# Patient Record
Sex: MALE | Race: WHITE | URBAN - METROPOLITAN AREA
[De-identification: names, ages, dates, MRNs, and addresses within clinical notes are randomized per-mention and may not be internally consistent; named-entity substitution may affect disease eponyms.]

---

## 2021-04-04 NOTE — PROGRESS NOTES
Assessment and Plan   Diagnoses and all orders for this visit:    1. Mild intermittent asthma without complication  -     albuterol (PROVENTIL HFA, VENTOLIN HFA, PROAIR HFA) 90 mcg/actuation inhaler; Take 1 Puff by inhalation every four (4) hours as needed for Wheezing. Worse with allergies. Reports wheezing and coughing when he has been running. Recommend starting albuterol prior to exercise. If persistent, consider steroid inhaler. Reports he has been on Qvar in the past    2. Insomnia, unspecified type  -     suvorexant (BELSOMRA) 10 mg tablet; Take 1 Tab by mouth nightly as needed for Insomnia. Max Daily Amount: 10 mg.  Previous medications: Trazodone(oversedation, made symptoms worse), Ambien (sleepwalking); wants to avoid antidepressants as those make his symptoms worse    Lifelong issue since he was a child. Wakes up about 3-4 times a night. Able to fall asleep with medications but not sleep through the night. Currently on melatonin 10 mg and Unisom 25 mg. Not well controlled. Discussed trial of Belsomra. If not covered or not effective, we also discussed dayvigo. Did not want to try doxepin as he feels antidepressants make his symptoms worse. 3. Gastroesophageal reflux disease without esophagitis  Triggered by food. Uses Nexium as needed    4. History of inguinal hernia  Has had 2 hernia repairs in the past with a mesh on his right side. Was told by his surgeon that he will likely need repairs in the future. Patient would like to defer Be well testing at this time    History of depression  Previously on Zoloft but has not been an issue for the past 11 years    Benefits, risks, possible drug interactions, and side effects of all new medications were reviewed with the patient. Pt verbalized understanding. Return to clinic: 1 year for physical if sleep medications work, earlier if needed    An electronic signature was used to authenticate this note.   Barron Warner MD  [facility]  4/5/2021    No future appointments. History of Present Illness   Chief Complaint   Establish care    Amanda Hernandez is a 30 y.o. male         Review of Systems   Constitutional: Negative for chills and fever. HENT: Negative for hearing loss. Eyes: Negative for blurred vision. Respiratory: Negative for shortness of breath. Cardiovascular: Negative for chest pain. Gastrointestinal: Negative for abdominal pain, blood in stool, constipation, diarrhea, melena, nausea and vomiting. Genitourinary: Negative for dysuria and hematuria. Musculoskeletal: Negative for joint pain. Skin: Negative for rash. Neurological: Negative for headaches. Past Medical History     Allergies   Allergen Reactions    Penicillins Rash     Can do keflex        Current Outpatient Medications   Medication Sig    melatonin 5 mg tablet Take 10 mg by mouth nightly.  doxylamine succinate (UNISOM) 25 mg tablet Take 25 mg by mouth nightly as needed.  esomeprazole (NEXIUM) 40 mg capsule Take 40 mg by mouth. Daily prn    albuterol (PROVENTIL HFA, VENTOLIN HFA, PROAIR HFA) 90 mcg/actuation inhaler Take 1 Puff by inhalation every four (4) hours as needed for Wheezing.  suvorexant (BELSOMRA) 10 mg tablet Take 1 Tab by mouth nightly as needed for Insomnia. Max Daily Amount: 10 mg. No current facility-administered medications for this visit.            Patient Active Problem List   Diagnosis Code    History of inguinal hernia Z87.19    Gastroesophageal reflux disease without esophagitis K21.9    Insomnia, unspecified type G47.00    Mild intermittent asthma without complication I27.23     Past Surgical History:   Procedure Laterality Date    HX APPENDECTOMY      HX HERNIA REPAIR      repair at 7yo L, 24yo R      Social History     Tobacco Use    Smoking status: Never Smoker    Smokeless tobacco: Never Used   Substance Use Topics    Alcohol use: Yes     Comment: 6 drinks/week      Family History   Problem Relation Age of Onset    Other Mother         multiple slcerosis    Cancer Father         tongue (from HPV)    Heart Failure Maternal Grandmother     COPD Maternal Grandmother     Asthma Maternal Grandmother     Hypertension Maternal Grandfather     Heart Disease Maternal Grandfather         afib    High Cholesterol Maternal Grandfather     Alzheimer Paternal Grandfather     Asthma Maternal Great Grandmother     Stroke Neg Hx     Heart Attack Neg Hx         Physical Exam   Vitals:       Visit Vitals  BP (!) 138/90   Pulse 93   Temp 98.2 °F (36.8 °C)   Ht 5' 11\" (1.803 m)   Wt 165 lb 12.8 oz (75.2 kg)   SpO2 96%   BMI 23.12 kg/m²        Physical Exam  Constitutional:       General: He is not in acute distress. Appearance: He is well-developed. HENT:      Right Ear: Tympanic membrane and external ear normal. There is impacted cerumen. Left Ear: Tympanic membrane and external ear normal. There is impacted cerumen. Eyes:      Extraocular Movements: Extraocular movements intact. Conjunctiva/sclera: Conjunctivae normal.   Neck:      Musculoskeletal: Neck supple. Cardiovascular:      Rate and Rhythm: Normal rate and regular rhythm. Pulses: Normal pulses. Heart sounds: No murmur. No friction rub. No gallop. Pulmonary:      Effort: No respiratory distress. Breath sounds: No wheezing, rhonchi or rales. Abdominal:      General: Bowel sounds are normal. There is no distension. Palpations: Abdomen is soft. There is no hepatomegaly, splenomegaly or mass. Tenderness: There is no abdominal tenderness. There is no guarding. Skin:     General: Skin is warm. Findings: No rash. Neurological:      Mental Status: He is alert.

## 2021-04-05 ENCOUNTER — OFFICE VISIT (OUTPATIENT)
Dept: INTERNAL MEDICINE CLINIC | Age: 26
End: 2021-04-05
Payer: COMMERCIAL

## 2021-04-05 VITALS
DIASTOLIC BLOOD PRESSURE: 90 MMHG | OXYGEN SATURATION: 96 % | BODY MASS INDEX: 23.21 KG/M2 | WEIGHT: 165.8 LBS | HEIGHT: 71 IN | SYSTOLIC BLOOD PRESSURE: 138 MMHG | HEART RATE: 93 BPM | TEMPERATURE: 98.2 F

## 2021-04-05 DIAGNOSIS — Z86.59 HISTORY OF DEPRESSION: ICD-10-CM

## 2021-04-05 DIAGNOSIS — K21.9 GASTROESOPHAGEAL REFLUX DISEASE WITHOUT ESOPHAGITIS: ICD-10-CM

## 2021-04-05 DIAGNOSIS — Z87.19 HISTORY OF INGUINAL HERNIA: ICD-10-CM

## 2021-04-05 DIAGNOSIS — G47.00 INSOMNIA, UNSPECIFIED TYPE: ICD-10-CM

## 2021-04-05 DIAGNOSIS — J45.20 MILD INTERMITTENT ASTHMA WITHOUT COMPLICATION: Primary | ICD-10-CM

## 2021-04-05 PROCEDURE — 99204 OFFICE O/P NEW MOD 45 MIN: CPT | Performed by: INTERNAL MEDICINE

## 2021-04-05 RX ORDER — CHOLECALCIFEROL (VITAMIN D3) 125 MCG
10 CAPSULE ORAL
COMMUNITY

## 2021-04-05 RX ORDER — ESOMEPRAZOLE MAGNESIUM 40 MG/1
20 CAPSULE, DELAYED RELEASE ORAL
COMMUNITY
End: 2022-09-26

## 2021-04-05 RX ORDER — ALBUTEROL SULFATE 90 UG/1
1 AEROSOL, METERED RESPIRATORY (INHALATION)
Qty: 1 INHALER | Refills: 3 | Status: SHIPPED | OUTPATIENT
Start: 2021-04-05

## 2021-04-08 ENCOUNTER — TELEPHONE (OUTPATIENT)
Dept: INTERNAL MEDICINE CLINIC | Age: 26
End: 2021-04-08

## 2021-04-08 ENCOUNTER — DOCUMENTATION ONLY (OUTPATIENT)
Dept: INTERNAL MEDICINE CLINIC | Age: 26
End: 2021-04-08

## 2021-04-08 NOTE — PROGRESS NOTES
PA for Belsomra sent to insurance via cover my meds. Key: [id number].  Waiting for response from insurance company

## 2021-04-08 NOTE — TELEPHONE ENCOUNTER
----- Message from [facility] sent at 4/8/2021 12:10 PM EDT -----  Regarding: Dr. Lanre Farnsworth Message/Vendor Calls    Caller's first and last name: Pt      Reason for call: Checking status of Prior Auth sent Monday      Callback required yes/no and why: Yes,       Best contact number(s): [phone]      Details to clarify the request: N/A      [facility]

## 2021-04-20 ENCOUNTER — DOCUMENTATION ONLY (OUTPATIENT)
Dept: INTERNAL MEDICINE CLINIC | Age: 26
End: 2021-04-20

## 2021-04-20 NOTE — PROGRESS NOTES
PA approved for Belsomra 10mg effective for maximum of 12 refills from 4/19/21-4/18/22. Pharmacy and patient made aware. PA for Belsomra sent to insurance via cover my meds. Key: [id number].  Waiting for response from insurance company

## 2021-06-18 DIAGNOSIS — G47.00 INSOMNIA, UNSPECIFIED TYPE: ICD-10-CM

## 2021-09-20 DIAGNOSIS — G47.00 INSOMNIA, UNSPECIFIED TYPE: ICD-10-CM

## 2022-01-09 DIAGNOSIS — G47.00 INSOMNIA, UNSPECIFIED TYPE: ICD-10-CM

## 2022-03-19 PROBLEM — Z87.19 HISTORY OF INGUINAL HERNIA: Status: ACTIVE | Noted: 2021-04-05

## 2022-03-19 PROBLEM — K21.9 GASTROESOPHAGEAL REFLUX DISEASE WITHOUT ESOPHAGITIS: Status: ACTIVE | Noted: 2021-04-05

## 2022-03-19 PROBLEM — J45.20 MILD INTERMITTENT ASTHMA WITHOUT COMPLICATION: Status: ACTIVE | Noted: 2021-04-05

## 2022-03-20 PROBLEM — G47.00 INSOMNIA, UNSPECIFIED TYPE: Status: ACTIVE | Noted: 2021-04-05

## 2022-04-09 DIAGNOSIS — G47.00 INSOMNIA, UNSPECIFIED TYPE: ICD-10-CM

## 2022-04-13 ENCOUNTER — VIRTUAL VISIT (OUTPATIENT)
Dept: INTERNAL MEDICINE CLINIC | Age: 27
End: 2022-04-13
Payer: COMMERCIAL

## 2022-04-13 ENCOUNTER — TELEPHONE (OUTPATIENT)
Dept: INTERNAL MEDICINE CLINIC | Age: 27
End: 2022-04-13

## 2022-04-13 DIAGNOSIS — J01.00 ACUTE NON-RECURRENT MAXILLARY SINUSITIS: Primary | ICD-10-CM

## 2022-04-13 PROCEDURE — 99213 OFFICE O/P EST LOW 20 MIN: CPT | Performed by: INTERNAL MEDICINE

## 2022-04-13 RX ORDER — CEFDINIR 300 MG/1
300 CAPSULE ORAL 2 TIMES DAILY
Qty: 20 CAPSULE | Refills: 0 | Status: SHIPPED | OUTPATIENT
Start: 2022-04-13 | End: 2022-05-03

## 2022-04-13 RX ORDER — CEFUROXIME AXETIL 500 MG/1
500 TABLET ORAL 2 TIMES DAILY
Qty: 20 TABLET | Refills: 0 | Status: SHIPPED | OUTPATIENT
Start: 2022-04-13 | End: 2022-04-13

## 2022-04-13 NOTE — TELEPHONE ENCOUNTER
Nurse called patient pharmacy to confirm that only  omnicef abx is needed per pcp. Pharmacist thankful for confirmation.

## 2022-04-13 NOTE — LETTER
NOTIFICATION RETURN TO WORK / SCHOOL    4/13/2022 12:42 PM    Mr. Eben Johns  [address] [facility] [address]      To Whom It May Concern:    Eben Johns is currently under the care of [address] [place] [address],[address]. Please excuse patient from work April 14, 2022 due to medical illness. If there are questions or concerns please have the patient contact our office.         Sincerely,      Jaswinder Kwon MD

## 2022-04-13 NOTE — TELEPHONE ENCOUNTER
[place] outpatient pharmacy is calling to ask if Dr. June Mendez really wants to prescribe both cefdinir and ceftriaxone as there is a lot of overlap and not much benefit to adding both together. Please call pharmacist back and advise.

## 2022-05-03 ENCOUNTER — OFFICE VISIT (OUTPATIENT)
Dept: INTERNAL MEDICINE CLINIC | Age: 27
End: 2022-05-03
Payer: COMMERCIAL

## 2022-05-03 VITALS
RESPIRATION RATE: 14 BRPM | HEART RATE: 72 BPM | BODY MASS INDEX: 23.1 KG/M2 | TEMPERATURE: 98 F | HEIGHT: 71 IN | WEIGHT: 165 LBS | SYSTOLIC BLOOD PRESSURE: 136 MMHG | DIASTOLIC BLOOD PRESSURE: 76 MMHG | OXYGEN SATURATION: 98 %

## 2022-05-03 DIAGNOSIS — J45.20 MILD INTERMITTENT ASTHMA WITHOUT COMPLICATION: ICD-10-CM

## 2022-05-03 DIAGNOSIS — Z00.00 WELL ADULT EXAM: Primary | ICD-10-CM

## 2022-05-03 DIAGNOSIS — G47.00 INSOMNIA, UNSPECIFIED TYPE: ICD-10-CM

## 2022-05-03 DIAGNOSIS — K21.9 GASTROESOPHAGEAL REFLUX DISEASE WITHOUT ESOPHAGITIS: ICD-10-CM

## 2022-05-03 PROBLEM — Z86.59 HISTORY OF DEPRESSION: Status: ACTIVE | Noted: 2022-05-03

## 2022-05-03 PROCEDURE — 99395 PREV VISIT EST AGE 18-39: CPT | Performed by: INTERNAL MEDICINE

## 2022-05-03 NOTE — PROGRESS NOTES
Assessment and Plan     1. Well adult exam  Assessment & Plan:  Advised to get PCV 20 vaccine due to history of pneumonia  Runs 5 miles about twice a week  Plans to get his labs with be well  2. Insomnia, unspecified type  Assessment & Plan:  4/5/21 - Lifelong issue since he was a child. Wakes up about 3-4 times a night. Able to fall asleep with medications but not sleep through the night. Currently on melatonin 10 mg and Unisom 25 mg.     Not well controlled. Discussed trial of Belsomra. If not covered or not effective, we also discussed dayvigo. Did not want to try doxepin as he feels antidepressants make his symptoms worse.  ===========  Borderline controlled with Belsomra 10 mg daily and melatonin 10 mg daily. We will also use Unisom as needed. We discussed potentially increasing to Belsomra 20 but he would like to monitor for now. Advised to let us know if he wants an increased dose. Previous medications: Trazodone(oversedation, made symptoms worse), Ambien (sleepwalking); wants to avoid antidepressants as those make his symptoms worse  Orders:  -     suvorexant (BELSOMRA) 10 mg tablet; Take 1 Tablet by mouth nightly as needed for Insomnia. Max Daily Amount: 10 mg., Normal, Disp-90 Tablet, R-3  3. Gastroesophageal reflux disease without esophagitis  Assessment & Plan:   well controlled, continue current medications   Triggered by food. Uses Nexium as needed  4. Mild intermittent asthma without complication  Assessment & Plan:   well controlled, continue current medications   Uses albuterol about every other week and sometimes with exercise  Previous medications: Qvar       Benefits, risks, possible drug interactions, and side effects of all new medications were reviewed with the patient. Pt verbalized understanding. Return to clinic: 1 year for physical or earlier if needed  [facility] pharmacist (usually ER, ICU)    An electronic signature was used to authenticate this note.   Ashlee Bravo, MD  [facility]  5/3/2022    No future appointments. History of Present Illness   Chief Complaint   Physical    Cristy Minor is a 27 y.o. male         Review of Systems   Constitutional: Negative for chills and fever. HENT: Negative for hearing loss. Eyes: Negative for blurred vision. Respiratory: Negative for shortness of breath. Cardiovascular: Negative for chest pain. Gastrointestinal: Negative for abdominal pain, blood in stool, constipation, diarrhea, melena, nausea and vomiting. Genitourinary: Negative for dysuria and hematuria. Musculoskeletal: Negative for joint pain. Skin: Negative for rash. Neurological: Negative for headaches. Past Medical History     Allergies   Allergen Reactions    Penicillins Rash     Can do keflex        Current Outpatient Medications   Medication Sig    suvorexant (BELSOMRA) 10 mg tablet Take 1 Tablet by mouth nightly as needed for Insomnia. Max Daily Amount: 10 mg.    melatonin 5 mg tablet Take 10 mg by mouth nightly.  doxylamine succinate (UNISOM) 25 mg tablet Take 25 mg by mouth nightly as needed.  esomeprazole (NEXIUM) 40 mg capsule Take 20 mg by mouth. Daily prn    albuterol (PROVENTIL HFA, VENTOLIN HFA, PROAIR HFA) 90 mcg/actuation inhaler Take 1 Puff by inhalation every four (4) hours as needed for Wheezing. No current facility-administered medications for this visit.           Patient Active Problem List   Diagnosis Code    History of inguinal hernia Z87.19    Gastroesophageal reflux disease without esophagitis K21.9    Insomnia, unspecified type G47.00    Mild intermittent asthma without complication H56.59    History of depression Z86.59    Well adult exam Z00.00     Past Surgical History:   Procedure Laterality Date    HX APPENDECTOMY      HX HERNIA REPAIR      repair at 7yo L, 22yo R      Social History     Tobacco Use    Smoking status: Never Smoker    Smokeless tobacco: Never Used Substance Use Topics    Alcohol use: Yes     Comment: 6 drinks/week      Family History   Problem Relation Age of Onset    Other Mother         multiple slcerosis    Cancer Father         tongue (from HPV)    Heart Failure Maternal Grandmother     COPD Maternal Grandmother     Asthma Maternal Grandmother     Hypertension Maternal Grandfather     Heart Disease Maternal Grandfather         afib    High Cholesterol Maternal Grandfather     Alzheimer's Disease Paternal Grandfather     Asthma Maternal Great Grandmother     Stroke Neg Hx     Heart Attack Neg Hx         Physical Exam   Vitals:       Visit Vitals  /76 (BP 1 Location: Left upper arm, BP Patient Position: Sitting, BP Cuff Size: Adult)   Pulse 72   Temp 98 °F (36.7 °C) (Oral)   Resp 14   Ht 5' 11\" (1.803 m)   Wt 165 lb (74.8 kg)   SpO2 98%   BMI 23.01 kg/m²        Physical Exam  Constitutional:       General: He is not in acute distress. Appearance: He is well-developed. HENT:      Right Ear: Tympanic membrane, ear canal and external ear normal.      Left Ear: Tympanic membrane, ear canal and external ear normal.      Mouth/Throat:      Mouth: Mucous membranes are moist.      Pharynx: No posterior oropharyngeal erythema. Eyes:      Extraocular Movements: Extraocular movements intact. Conjunctiva/sclera: Conjunctivae normal.   Cardiovascular:      Rate and Rhythm: Normal rate and regular rhythm. Pulses: Normal pulses. Heart sounds: No murmur heard. No friction rub. No gallop. Pulmonary:      Effort: No respiratory distress. Breath sounds: No wheezing, rhonchi or rales. Abdominal:      General: Bowel sounds are normal. There is no distension. Palpations: Abdomen is soft. There is no hepatomegaly, splenomegaly or mass. Tenderness: There is no abdominal tenderness. There is no guarding. Musculoskeletal:      Cervical back: Neck supple. Right lower leg: No edema. Left lower leg: No edema. Lymphadenopathy:      Cervical: No cervical adenopathy. Skin:     General: Skin is warm. Findings: No rash. Neurological:      Mental Status: He is alert.

## 2022-05-03 NOTE — ASSESSMENT & PLAN NOTE
4/5/21 - Lifelong issue since he was a child. Wakes up about 3-4 times a night. Able to fall asleep with medications but not sleep through the night. Currently on melatonin 10 mg and Unisom 25 mg.     Not well controlled. Discussed trial of Belsomra. If not covered or not effective, we also discussed dayvigo. Did not want to try doxepin as he feels antidepressants make his symptoms worse.  ===========  Borderline controlled with Belsomra 10 mg daily and melatonin 10 mg daily. We will also use Unisom as needed. We discussed potentially increasing to Belsomra 20 but he would like to monitor for now. Advised to let us know if he wants an increased dose.   Previous medications: Trazodone(oversedation, made symptoms worse), Ambien (sleepwalking); wants to avoid antidepressants as those make his symptoms worse

## 2022-05-03 NOTE — ASSESSMENT & PLAN NOTE
well controlled, continue current medications   Uses albuterol about every other week and sometimes with exercise  Previous medications: Qvar

## 2022-05-03 NOTE — ASSESSMENT & PLAN NOTE
Advised to get PCV 20 vaccine due to history of pneumonia  Runs 5 miles about twice a week  Plans to get his labs with be well

## 2022-06-02 PROBLEM — Z00.00 WELL ADULT EXAM: Status: RESOLVED | Noted: 2022-05-03 | Resolved: 2022-06-02

## 2022-06-15 ENCOUNTER — PATIENT MESSAGE (OUTPATIENT)
Dept: INTERNAL MEDICINE CLINIC | Age: 27
End: 2022-06-15

## 2022-06-16 RX ORDER — DOXYCYCLINE 100 MG/1
TABLET ORAL
Qty: 2 TABLET | Refills: 0 | Status: SHIPPED | OUTPATIENT
Start: 2022-06-16 | End: 2022-06-16 | Stop reason: SDUPTHER

## 2022-06-16 RX ORDER — DOXYCYCLINE 100 MG/1
TABLET ORAL
Qty: 2 TABLET | Refills: 0 | Status: SHIPPED | OUTPATIENT
Start: 2022-06-16 | End: 2022-06-29 | Stop reason: ALTCHOICE

## 2022-06-16 NOTE — TELEPHONE ENCOUNTER
I spoke with patient,  He states he sent a [employer] message yesterday and never got a response. He is worried about the deer tick he removed from his leg. The tick was there for 24-36 hours. Pt sent a [employer] message but it went to the general patient advice pool not our office, I explained to pt why we didn't receive the  Message directly yesterday. 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 I advised I will send this message to a covering provider.

## 2022-06-29 ENCOUNTER — OFFICE VISIT (OUTPATIENT)
Dept: INTERNAL MEDICINE CLINIC | Age: 27
End: 2022-06-29
Payer: COMMERCIAL

## 2022-06-29 VITALS
OXYGEN SATURATION: 97 % | WEIGHT: 168.4 LBS | TEMPERATURE: 97.8 F | SYSTOLIC BLOOD PRESSURE: 152 MMHG | HEIGHT: 71 IN | BODY MASS INDEX: 23.57 KG/M2 | HEART RATE: 71 BPM | RESPIRATION RATE: 14 BRPM | DIASTOLIC BLOOD PRESSURE: 83 MMHG

## 2022-06-29 DIAGNOSIS — K62.5 BRBPR (BRIGHT RED BLOOD PER RECTUM): Primary | ICD-10-CM

## 2022-06-29 PROCEDURE — 99212 OFFICE O/P EST SF 10 MIN: CPT | Performed by: INTERNAL MEDICINE

## 2022-06-29 NOTE — ASSESSMENT & PLAN NOTE
BRBPR  Ongoing episodes maybe once a month for at least the last 5 years   In toilet bowl, in stools, on toilet paper  Never had pain before but now hurts   occl diarrhea and constipation  Pain with sitting, itching  No lightheadedness, dizziness   No abd pain, v  Mild nausea  External hemorrhoid noted 12pm position, anal tear 4pm, possible small hemorrhoid 6pm position. Possible internal hemorrhoid 12pm position. Possibly related to hemorrhoids. However given age, diarrhea, and frequency of bleeding, recommend GI referral to eval for possible IBD.   Preparation H OTC for symptoms

## 2022-06-29 NOTE — PROGRESS NOTES
Note   Chief Complaint   BRBPR    Sylvain Haines is a 27 y.o. male     1. BRBPR (bright red blood per rectum)  Assessment & Plan:   BRBPR  Ongoing episodes maybe once a month for at least the last 5 years   In toilet bowl, in stools, on toilet paper  Never had pain before but now hurts   occl diarrhea and constipation  Pain with sitting, itching  No lightheadedness, dizziness   No abd pain, v  Mild nausea  External hemorrhoid noted 12pm position, anal tear 4pm, possible small hemorrhoid 6pm position. Possible internal hemorrhoid 12pm position. Possibly related to hemorrhoids. However given age, diarrhea, and frequency of bleeding, recommend GI referral to eval for possible IBD. Preparation H OTC for symptoms  Orders:  -     REFERRAL TO GASTROENTEROLOGY       Benefits, risks, possible drug interactions, and side effects of all new medications were reviewed with the patient. Pt verbalized understanding. Return to clinic:  1 year for physical or earlier if needed pending GI workup  [facility] pharmacist (usually ER, ICU)    An electronic signature was used to authenticate this note. Maeve Marino MD  [facility]  6/29/2022    No future appointments. Objective   Vitals:       Visit Vitals  BP (!) 152/83 (BP 1 Location: Left upper arm, BP Patient Position: Sitting, BP Cuff Size: Adult)   Pulse 71   Temp 97.8 °F (36.6 °C) (Oral)   Resp 14   Ht 5' 11\" (1.803 m)   Wt 168 lb 6.4 oz (76.4 kg)   SpO2 97%   BMI 23.49 kg/m²        Physical Exam  Constitutional:       Appearance: Normal appearance. He is not ill-appearing. Pulmonary:      Effort: No respiratory distress. Genitourinary:     Comments: External hemorrhoid noted 12pm position, anal tear 4pm, possible small hemorrhoid 6pm position. Possible internal hemorrhoid 12pm position. Neurological:      Mental Status: He is alert.           Current Outpatient Medications   Medication Sig    suvorexant (BELSOMRA) 10 mg tablet Take 1 Tablet by mouth nightly as needed for Insomnia. Max Daily Amount: 10 mg.    melatonin 5 mg tablet Take 10 mg by mouth nightly.  doxylamine succinate (UNISOM) 25 mg tablet Take 25 mg by mouth nightly as needed.  esomeprazole (NEXIUM) 40 mg capsule Take 20 mg by mouth. Daily prn    albuterol (PROVENTIL HFA, VENTOLIN HFA, PROAIR HFA) 90 mcg/actuation inhaler Take 1 Puff by inhalation every four (4) hours as needed for Wheezing. No current facility-administered medications for this visit.

## 2022-09-26 RX ORDER — PANTOPRAZOLE SODIUM 20 MG/1
20 TABLET, DELAYED RELEASE ORAL DAILY
Qty: 90 TABLET | Refills: 3 | Status: SHIPPED | OUTPATIENT
Start: 2022-09-26

## 2022-11-18 DIAGNOSIS — G47.00 INSOMNIA, UNSPECIFIED TYPE: ICD-10-CM

## 2022-11-19 RX ORDER — SUVOREXANT 10 MG/1
TABLET, FILM COATED ORAL
Qty: 90 TABLET | Refills: 3 | Status: SHIPPED | OUTPATIENT
Start: 2022-11-19

## 2023-01-04 RX ORDER — PANTOPRAZOLE SODIUM 20 MG/1
20 TABLET, DELAYED RELEASE ORAL DAILY
Qty: 90 TABLET | Refills: 3 | Status: SHIPPED | OUTPATIENT
Start: 2023-01-04

## 2023-02-27 ENCOUNTER — TRANSCRIBE ORDER (OUTPATIENT)
Dept: GENERAL RADIOLOGY | Age: 28
End: 2023-02-27

## 2023-02-27 ENCOUNTER — HOSPITAL ENCOUNTER (OUTPATIENT)
Dept: GENERAL RADIOLOGY | Age: 28
Discharge: HOME | End: 2023-02-27
Payer: COMMERCIAL

## 2023-02-27 DIAGNOSIS — M77.42 METATARSALGIA OF LEFT FOOT: Primary | ICD-10-CM

## 2023-02-27 DIAGNOSIS — M77.42 METATARSALGIA OF LEFT FOOT: ICD-10-CM

## 2023-02-27 PROCEDURE — 73630 X-RAY EXAM OF FOOT: CPT

## 2023-03-07 ENCOUNTER — OFFICE VISIT (OUTPATIENT)
Dept: FAMILY MEDICINE CLINIC | Age: 28
End: 2023-03-07
Payer: COMMERCIAL

## 2023-03-07 VITALS
WEIGHT: 172 LBS | SYSTOLIC BLOOD PRESSURE: 138 MMHG | BODY MASS INDEX: 24.08 KG/M2 | RESPIRATION RATE: 16 BRPM | TEMPERATURE: 97.3 F | OXYGEN SATURATION: 97 % | HEART RATE: 76 BPM | HEIGHT: 71 IN | DIASTOLIC BLOOD PRESSURE: 81 MMHG

## 2023-03-07 DIAGNOSIS — K64.4 INTERNAL AND EXTERNAL BLEEDING HEMORRHOIDS: ICD-10-CM

## 2023-03-07 DIAGNOSIS — M10.072 ACUTE IDIOPATHIC GOUT OF LEFT FOOT: ICD-10-CM

## 2023-03-07 DIAGNOSIS — K64.8 INTERNAL AND EXTERNAL BLEEDING HEMORRHOIDS: ICD-10-CM

## 2023-03-07 DIAGNOSIS — J45.20 MILD INTERMITTENT ASTHMA WITHOUT COMPLICATION: Primary | ICD-10-CM

## 2023-03-07 DIAGNOSIS — G47.00 INSOMNIA, UNSPECIFIED TYPE: ICD-10-CM

## 2023-03-07 PROCEDURE — 99214 OFFICE O/P EST MOD 30 MIN: CPT | Performed by: FAMILY MEDICINE

## 2023-03-07 RX ORDER — PSYLLIUM HUSK 0.4 G
0.8 CAPSULE ORAL 3 TIMES DAILY
Qty: 180 CAPSULE | Refills: 3 | Status: SHIPPED | OUTPATIENT
Start: 2023-03-07

## 2023-03-07 RX ORDER — CHOLECALCIFEROL (VITAMIN D3) 125 MCG
CAPSULE ORAL
COMMUNITY

## 2023-03-07 RX ORDER — PREDNISONE 20 MG/1
TABLET ORAL
COMMUNITY

## 2023-03-07 NOTE — PROGRESS NOTES
Chief Complaint   Patient presents with    Establish Care     Would like labs to test for gout; noc at this time

## 2023-03-07 NOTE — ASSESSMENT & PLAN NOTE
No history of predisposing disease. Does drink regular alcohol and eat red meat, and family history. Patient has acute insult to feet which could have precipitated event. We are 2 weeks out from event, could consider uric acid study, though usually only done with intent to have preventative therapy. Given first time event and responsiveness to antiinflammatories, patient and I discussed, and agreed to wait and see if second event occurs.  If so, will draw BMP and Uric acid study then, and consider preventative medications allopurinol vs colchicine

## 2023-03-07 NOTE — PROGRESS NOTES
Family Medicine Initial Office Visit  Patient: Willy Aguilar  1995, 32 y.o., male  Encounter Date: 3/7/2023    ASSESSMENT & PLAN  Chronic Conditions Addressed Today       1. Insomnia, unspecified type       borderline controlled, continue current medications, lifestyle modifications recommended         2. Mild intermittent asthma without complication - Primary     Relevant Medications     predniSONE (DELTASONE) 20 mg tablet    3. Internal and external bleeding hemorrhoids     Relevant Medications     psyllium husk (MetamuciL) 0.4 gram cap    4. Acute idiopathic gout of left foot       No history of predisposing disease. Does drink regular alcohol and eat red meat, and family history. Patient has acute insult to feet which could have precipitated event. We are 2 weeks out from event, could consider uric acid study, though usually only done with intent to have preventative therapy. Given first time event and responsiveness to antiinflammatories, patient and I discussed, and agreed to wait and see if second event occurs. If so, will draw BMP and Uric acid study then, and consider preventative medications allopurinol vs colchicine           Relevant Medications     naproxen sodium 220 mg cap     predniSONE (DELTASONE) 20 mg tablet       Orders Placed This Encounter    naproxen sodium 220 mg cap     Sig: naproxen sodium 220 mg capsule   Take 1 capsule every 12 hours by oral route with meals. predniSONE (DELTASONE) 20 mg tablet     Sig: prednisone 20 mg tablet   TAKE 2 TABLETS BY MOUTH EVERY DAY FOR 5 DAYS    psyllium husk (MetamuciL) 0.4 gram cap     Sig: Take 0.8 g by mouth three (3) times daily. Indications: constipation     Dispense:  180 Capsule     Refill:  3     There are no Patient Instructions on file for this visit.     CHIEF COMPLAINT  Chief Complaint   Patient presents with    Establish Care     Would like labs to test for gout; noc at this time          SUBJECTIVE  Willy Aguilar is a 32 y.o. male presenting today for establishing care. 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 Patient lives in a new house with wife (legally  but having formal wedding in May 2023). Patient was last seen by primary care this past year prior to [facility] workup. Patient last saw a dentist due its been 1 year; no problems. Patient last had eye exam no issues. Exercise: recently joined a gym. Diet / [employer] [place]    Tobacco:none  EtOH:1-2 beers per night. Illicit Substances:none    Sexual Activity:yes; no concerns. Review of Systems    Chronic Conditions Addressed Today       1. Insomnia, unspecified type     Overview      13 years of suffering with middle insomnia. Medication: melatonin helps with getting to sleep, Belsomra 10mg daily keeps asleep. Previous Medications Tried: Xanax (led to dependence), Ambien led to sleep walking and dangerous situations, Trazodone only caused drowsiness without effectiveness. Interval Hx:   Reports stability on current medication regimen. Has noticed that exercising more has been helpful, wants to explore that more. Got a gym membership. Current Assessment & Plan       borderline controlled, continue current medications, lifestyle modifications recommended         2. Mild intermittent asthma without complication - Primary     Relevant Medications     predniSONE (DELTASONE) 20 mg tablet    3. Internal and external bleeding hemorrhoids     Overview      Background: once a month for 5 years. Interval Hx: underwent colonoscopy, significant internal and external hemorrhoids; negative inflammatory markers. Has been controlling with topical treatments, and has significantly increased dietary fiber. Relevant Medications     psyllium husk (MetamuciL) 0.4 gram cap    4.  Acute idiopathic gout of left foot     Overview      Recent first acute gout flare, most likely given intensity of pain and redness and quick response to NSAIDs plus prednisone. Drinks a couple of beers every day or two. Does eat red meat but not exclusively. No previous attacks recorded. Never had Uric acid level drawn. No hx of renal disease. Father had gout. Current Assessment & Plan       No history of predisposing disease. Does drink regular alcohol and eat red meat, and family history. Patient has acute insult to feet which could have precipitated event. We are 2 weeks out from event, could consider uric acid study, though usually only done with intent to have preventative therapy. Given first time event and responsiveness to antiinflammatories, patient and I discussed, and agreed to wait and see if second event occurs. If so, will draw BMP and Uric acid study then, and consider preventative medications allopurinol vs colchicine           Relevant Medications     naproxen sodium 220 mg cap     predniSONE (DELTASONE) 20 mg tablet        OBJECTIVE  Visit Vitals  /81   Pulse 76   Temp 97.3 °F (36.3 °C)   Resp 16   Ht 5' 11\" (1.803 m)   Wt 172 lb (78 kg)   SpO2 97%   BMI 23.99 kg/m²       Physical Exam    No results found for any visits on 03/07/23.     HISTORICAL  Reviewed and updated today, and as noted below:    Past Medical History:   Diagnosis Date    GERD (gastroesophageal reflux disease)      Past Surgical History:   Procedure Laterality Date    HX APPENDECTOMY      HX HERNIA REPAIR      repair at 5yo L, 24yo R     Family History   Problem Relation Age of Onset    Other Mother         multiple slcerosis    Cancer Father         tongue (from HPV)    Heart Failure Maternal Grandmother     COPD Maternal Grandmother     Asthma Maternal Grandmother     Hypertension Maternal Grandfather     Heart Disease Maternal Grandfather         afib    High Cholesterol Maternal Grandfather     Alzheimer's Disease Paternal Grandfather     Asthma Maternal Great Grandmother     Stroke Neg Hx     Heart Attack Neg Hx      Social History     Tobacco Use   Smoking Status Never Smokeless Tobacco Never     Social History     Socioeconomic History    Marital status:    Tobacco Use    Smoking status: Never    Smokeless tobacco: Never   Vaping Use    Vaping Use: Never used   Substance and Sexual Activity    Alcohol use: Yes     Comment: 6 drinks/week    Drug use: Never    Sexual activity: Yes     Partners: Female     Allergies   Allergen Reactions    Penicillins Rash and Hives     Can do keflex       No visits with results within 3 Month(s) from this visit. Latest known visit with results is:   No results found for any previous visit. Kaveh Hackett MD  [facility]  03/07/23 3:25 PM       Portions of this note may have been populated using smart dictation software and may have \"sounds-like\" errors present. Pt was counseled on risks, benefits and alternatives of treatment options. All questions were asked and answered and the patient was agreeable with the treatment plan as outlined.

## 2023-04-26 ENCOUNTER — PATIENT MESSAGE (OUTPATIENT)
Dept: FAMILY MEDICINE CLINIC | Age: 28
End: 2023-04-26

## 2023-04-26 NOTE — TELEPHONE ENCOUNTER
From: Carmelina Negron  To: Manasa Hansen MD  Sent: 4/26/2023 1:31 PM EDT  Subject: Saira Maldonado Done,   I'm in a bit of a situation - Stefanie Scales is backordered through [facility] and they can't borrow from another facility since they all use the same supplier. I only have 3 tablets left. I've failed most other medications and was wondering if we could do an as needed prescription in the meantime until the backorder resolves and they can get the drug in. Would it be possible to get a short duration like 10-15 tablets of something short acting such as alprazolam 0.5 mg? This worked well for me in the past, I just don't want to take BZD regularly. Z drugs cause sleepwaking and trazodone causes excessive daytime drowsiness for me so i'm pretty limited in alternatives that would actually be effective and not lead to issues such as drowsiness at work due to the nature of my job.      Thanks for reviewing,   - Winifred Carvajal

## 2023-04-30 ENCOUNTER — APPOINTMENT (OUTPATIENT)
Dept: CT IMAGING | Age: 28
End: 2023-04-30
Attending: NURSE PRACTITIONER
Payer: COMMERCIAL

## 2023-04-30 ENCOUNTER — HOSPITAL ENCOUNTER (EMERGENCY)
Age: 28
Discharge: HOME | End: 2023-04-30
Attending: EMERGENCY MEDICINE
Payer: COMMERCIAL

## 2023-04-30 VITALS
TEMPERATURE: 98.1 F | SYSTOLIC BLOOD PRESSURE: 154 MMHG | HEART RATE: 93 BPM | RESPIRATION RATE: 18 BRPM | DIASTOLIC BLOOD PRESSURE: 93 MMHG | HEIGHT: 71 IN | WEIGHT: 175 LBS | BODY MASS INDEX: 24.5 KG/M2 | OXYGEN SATURATION: 95 %

## 2023-04-30 DIAGNOSIS — S39.012A LUMBAR STRAIN, INITIAL ENCOUNTER: Primary | ICD-10-CM

## 2023-04-30 PROCEDURE — 72131 CT LUMBAR SPINE W/O DYE: CPT

## 2023-04-30 PROCEDURE — 74011250636 HC RX REV CODE- 250/636: Performed by: NURSE PRACTITIONER

## 2023-04-30 PROCEDURE — 74011250637 HC RX REV CODE- 250/637: Performed by: NURSE PRACTITIONER

## 2023-04-30 PROCEDURE — 99284 EMERGENCY DEPT VISIT MOD MDM: CPT

## 2023-04-30 PROCEDURE — 96372 THER/PROPH/DIAG INJ SC/IM: CPT

## 2023-04-30 RX ORDER — PREDNISONE 10 MG/1
TABLET ORAL
Qty: 21 TABLET | Refills: 0 | Status: SHIPPED | OUTPATIENT
Start: 2023-04-30 | End: 2023-04-30 | Stop reason: SDUPTHER

## 2023-04-30 RX ORDER — CYCLOBENZAPRINE HCL 10 MG
10 TABLET ORAL
Status: COMPLETED | OUTPATIENT
Start: 2023-04-30 | End: 2023-04-30

## 2023-04-30 RX ORDER — PREDNISONE 10 MG/1
TABLET ORAL
Qty: 21 TABLET | Refills: 0 | OUTPATIENT
Start: 2023-04-30 | End: 2023-05-01 | Stop reason: SDUPTHER

## 2023-04-30 RX ORDER — CYCLOBENZAPRINE HCL 10 MG
10 TABLET ORAL
Qty: 15 TABLET | Refills: 0 | Status: SHIPPED | OUTPATIENT
Start: 2023-04-30

## 2023-04-30 RX ADMIN — CYCLOBENZAPRINE 10 MG: 10 TABLET, FILM COATED ORAL at 15:28

## 2023-04-30 RX ADMIN — METHYLPREDNISOLONE SODIUM SUCCINATE 125 MG: 125 INJECTION, POWDER, FOR SOLUTION INTRAMUSCULAR; INTRAVENOUS at 15:28

## 2023-05-01 ENCOUNTER — PATIENT OUTREACH (OUTPATIENT)
Dept: OTHER | Age: 28
End: 2023-05-01

## 2023-05-01 PROBLEM — M48.07 SPINAL STENOSIS, LUMBOSACRAL REGION: Status: ACTIVE | Noted: 2023-05-01

## 2023-05-01 RX ORDER — PREDNISONE 10 MG/1
TABLET ORAL
Qty: 21 TABLET | Refills: 0 | Status: SHIPPED | OUTPATIENT
Start: 2023-05-01 | End: 2023-05-01 | Stop reason: SDUPTHER

## 2023-05-01 RX ORDER — PREDNISONE 10 MG/1
TABLET ORAL
Qty: 21 TABLET | Refills: 0 | Status: SHIPPED | OUTPATIENT
Start: 2023-05-01

## 2023-05-01 NOTE — PROGRESS NOTES
Patient on report as eligible for Case Management. Left discreet message on voicemail with this CM contact information. Will attempt to contact again to offer [address] [place] [address] Management services. Will attempt another outreach on 5/2/23. Per chart review, patient had an ED visit to [facility] on 4/30/23 due to back pain. Given scripts for flexeril and prednisone. Also, encouraged to f/up with Dr. Jc Cleaning, ortho surgeon.

## 2023-05-02 ENCOUNTER — PATIENT OUTREACH (OUTPATIENT)
Dept: OTHER | Age: 28
End: 2023-05-02

## 2023-05-24 RX ORDER — COVID-19 ANTIGEN TEST
KIT MISCELLANEOUS
COMMUNITY

## 2023-05-24 RX ORDER — PREDNISONE 10 MG/1
TABLET ORAL
COMMUNITY
Start: 2023-05-01

## 2023-05-24 RX ORDER — CHOLECALCIFEROL (VITAMIN D3) 125 MCG
10 CAPSULE ORAL NIGHTLY
COMMUNITY

## 2023-05-24 RX ORDER — ALBUTEROL SULFATE 90 UG/1
1 AEROSOL, METERED RESPIRATORY (INHALATION) EVERY 4 HOURS PRN
COMMUNITY
Start: 2021-04-05

## 2023-05-24 RX ORDER — PANTOPRAZOLE SODIUM 20 MG/1
20 TABLET, DELAYED RELEASE ORAL DAILY
COMMUNITY
Start: 2023-01-04

## 2023-05-24 RX ORDER — SUVOREXANT 10 MG/1
TABLET, FILM COATED ORAL
COMMUNITY
Start: 2022-11-19 | End: 2023-05-30 | Stop reason: SDUPTHER

## 2023-05-24 RX ORDER — CYCLOBENZAPRINE HCL 10 MG
10 TABLET ORAL 3 TIMES DAILY PRN
COMMUNITY
Start: 2023-04-30

## 2023-05-28 ENCOUNTER — PATIENT MESSAGE (OUTPATIENT)
Facility: CLINIC | Age: 28
End: 2023-05-28

## 2023-05-28 DIAGNOSIS — G47.00 INSOMNIA, UNSPECIFIED TYPE: Primary | ICD-10-CM

## 2023-05-30 RX ORDER — SUVOREXANT 10 MG/1
TABLET, FILM COATED ORAL
Qty: 90 TABLET | Refills: 1 | OUTPATIENT
Start: 2023-05-30

## 2023-05-30 RX ORDER — SUVOREXANT 10 MG/1
TABLET, FILM COATED ORAL
Qty: 30 TABLET | Refills: 3 | Status: SHIPPED | OUTPATIENT
Start: 2023-05-30 | End: 2023-09-27

## 2023-05-30 NOTE — TELEPHONE ENCOUNTER
From: Arelis Iverson  To: Dr. Srinivasan Garza  Sent: 2023 8:15 PM EDT  Subject: Essence Rasmussen Done can I please get Rx refill for Belsomra 10 mg? Mychart not allowing me to send a standard request. Original Rx from Dr. Kole Brown  5/10/38.      Thanks!    - Christine Payne

## 2023-10-15 DIAGNOSIS — G47.00 INSOMNIA, UNSPECIFIED TYPE: Primary | ICD-10-CM

## 2023-10-16 RX ORDER — SUVOREXANT 10 MG/1
TABLET, FILM COATED ORAL
Qty: 30 TABLET | Refills: 0 | Status: SHIPPED | OUTPATIENT
Start: 2023-10-16 | End: 2023-11-15

## 2023-10-18 ENCOUNTER — PATIENT MESSAGE (OUTPATIENT)
Facility: CLINIC | Age: 28
End: 2023-10-18

## 2023-10-20 ENCOUNTER — TELEPHONE (OUTPATIENT)
Facility: CLINIC | Age: 28
End: 2023-10-20

## 2023-10-24 NOTE — TELEPHONE ENCOUNTER
Radames Gautam has been denied. This medication is not covered under pt's pharmacy benefit plan. No alternatives provided.

## 2023-10-30 ENCOUNTER — TELEPHONE (OUTPATIENT)
Facility: CLINIC | Age: 28
End: 2023-10-30

## 2023-10-30 NOTE — TELEPHONE ENCOUNTER
Pt is asking if we can appeal the rejected PA for Belsomra 10mg    He really wants this medication approved, it works well for him. He has tried others that do not do as well.

## 2023-11-13 ENCOUNTER — PATIENT MESSAGE (OUTPATIENT)
Facility: CLINIC | Age: 28
End: 2023-11-13

## 2023-11-14 NOTE — TELEPHONE ENCOUNTER
From: Angel Richards  To: Dr. London Carl: 11/13/2023 9:36 PM EST  Subject: Td    Hi Dr. Tony Ortiz,     I was working outside today and managed to cut myself on a rusted nail off my fence. It was an abrasion and non-penetrating, but did bleed a fair amount. I have to get my flu and COVID vaccines and haven't had a tetanus vaccine in over 10 years which is concerning. Would you be able to Rx a Td vax to the [address] [place] [address] on [address] [place] [address]? Would like to take care of all the vaccines in one visit and the rusted nail kind of puts me on a timer. I've had good luck with getting appointments at that pharmacy.     Thanks!  - Cammie Zafar

## 2023-12-05 ENCOUNTER — PATIENT MESSAGE (OUTPATIENT)
Facility: CLINIC | Age: 28
End: 2023-12-05

## 2023-12-05 DIAGNOSIS — G47.00 INSOMNIA, UNSPECIFIED TYPE: ICD-10-CM

## 2023-12-06 RX ORDER — SUVOREXANT 10 MG/1
10 TABLET, FILM COATED ORAL NIGHTLY PRN
Qty: 90 TABLET | Refills: 1 | Status: SHIPPED | OUTPATIENT
Start: 2023-12-06 | End: 2024-06-03

## 2023-12-06 RX ORDER — SUVOREXANT 10 MG/1
TABLET, FILM COATED ORAL
Qty: 30 TABLET | Refills: 3 | Status: SHIPPED | OUTPATIENT
Start: 2023-12-06 | End: 2023-12-06 | Stop reason: SDUPTHER

## 2023-12-06 RX ORDER — SUVOREXANT 10 MG/1
10 TABLET, FILM COATED ORAL
Qty: 90 TABLET | Refills: 0 | Status: CANCELLED | OUTPATIENT
Start: 2023-12-06 | End: 2024-03-05

## 2023-12-06 NOTE — TELEPHONE ENCOUNTER
From: Ed Srinivasan  To: Dr. Catalino Sanon  Sent: 12/5/2023 9:43 PM EST  Subject: Yemi Coup Dr. Brian Flores,     My Rx fell off the list- recently got prior authorization approved.      Requesting refill of the belsomra 10 mg at bedtime as needed and to have added back to medication list.     Thanks!    - Beverely [place]

## 2024-01-05 RX ORDER — PANTOPRAZOLE SODIUM 20 MG/1
20 TABLET, DELAYED RELEASE ORAL DAILY
Qty: 90 TABLET | Refills: 1 | Status: SHIPPED | OUTPATIENT
Start: 2024-01-05

## 2024-01-05 NOTE — TELEPHONE ENCOUNTER
Requested Prescriptions     Pending Prescriptions Disp Refills    pantoprazole (PROTONIX) 20 MG tablet [Pharmacy Med Name: PANTOPRAZOLE SODIUM 20MG TBEC] 90 tablet 3     Sig: TAKE ONE TABLET BY MOUTH ONE TIME A DAY

## 2024-03-21 ENCOUNTER — OFFICE VISIT (OUTPATIENT)
Facility: CLINIC | Age: 29
End: 2024-03-21
Payer: COMMERCIAL

## 2024-03-21 VITALS
OXYGEN SATURATION: 98 % | BODY MASS INDEX: 25.05 KG/M2 | WEIGHT: 175 LBS | HEIGHT: 70 IN | DIASTOLIC BLOOD PRESSURE: 70 MMHG | HEART RATE: 102 BPM | SYSTOLIC BLOOD PRESSURE: 142 MMHG | RESPIRATION RATE: 20 BRPM | TEMPERATURE: 98.1 F

## 2024-03-21 DIAGNOSIS — I10 PRIMARY HYPERTENSION: ICD-10-CM

## 2024-03-21 DIAGNOSIS — G47.00 INSOMNIA, UNSPECIFIED TYPE: ICD-10-CM

## 2024-03-21 DIAGNOSIS — Z71.89 ACP (ADVANCE CARE PLANNING): ICD-10-CM

## 2024-03-21 DIAGNOSIS — Z00.00 ENCOUNTER FOR WELL ADULT EXAM WITHOUT ABNORMAL FINDINGS: Primary | ICD-10-CM

## 2024-03-21 PROCEDURE — 99395 PREV VISIT EST AGE 18-39: CPT | Performed by: FAMILY MEDICINE

## 2024-03-21 PROCEDURE — 99214 OFFICE O/P EST MOD 30 MIN: CPT | Performed by: FAMILY MEDICINE

## 2024-03-21 PROCEDURE — 3077F SYST BP >= 140 MM HG: CPT | Performed by: FAMILY MEDICINE

## 2024-03-21 PROCEDURE — 3078F DIAST BP <80 MM HG: CPT | Performed by: FAMILY MEDICINE

## 2024-03-21 RX ORDER — SUVOREXANT 10 MG/1
10 TABLET, FILM COATED ORAL NIGHTLY PRN
Qty: 90 TABLET | Refills: 3 | Status: SHIPPED | OUTPATIENT
Start: 2024-03-21 | End: 2025-03-16

## 2024-03-21 NOTE — PROGRESS NOTES
Chief Complaint   Patient presents with    Follow-up     Fu for normal appointment has no concerns at this time gen health is pretty good.      \"Have you been to the ER, urgent care clinic since your last visit?  Hospitalized since your last visit?\"    NO    “Have you seen or consulted any other health care providers outside of [facility] since your last visit?”    NO

## 2024-03-21 NOTE — ASSESSMENT & PLAN NOTE
Borderline controlled, continue current medications and continue current treatment plan. Continue working on medication reduction.

## 2024-03-21 NOTE — PROGRESS NOTES
Well Adult Note  Name: Marv Baker Today’s Date: 3/21/2024   MRN: [id number] Sex: Male   Age: 28 y.o. Ethnicity: Non- / Non    : 1995 Race: White (non-)      Marv Baker is here for well adult exam.  History:  1. Encounter for well adult exam without abnormal findings  2. ACP (advance care planning)  3. Insomnia, unspecified type  Overview:  13 years of suffering with middle insomnia.   Medication: melatonin 5mg daily, doxylamine 12.5mg daily, helps with getting to sleep, Belsomra 10mg daily   keeps asleep.  Previous Medications Tried: Xanax (led to dependence), Ambien led to sleep walking and dangerous situations, Trazodone only caused drowsiness without effectiveness.    Interval Hx:   Has been able to reduce medications as noted above, that is about 50% of what it was; has been working on lifestyle and caffeine reduction.  - next goal is to add exercise to regimen.    Assessment & Plan:   Borderline controlled, continue current medications and continue current treatment plan. Continue working on medication reduction.  Orders:  -     Suvorexant (BELSOMRA) 10 MG TABS; Take 10 mg by mouth nightly as needed (insomnia) for up to 360 days. Max Daily Amount: 10 mg, Disp-90 tablet, R-3Normal  4. Primary hypertension  Overview:  BP Readings from Last 3 Encounters:   24 (!) 142/70   23 138/81   22 (!) 152/83     Medication: none    Interval Hx:  - new dx, long time coming, family hx strong.  Assessment & Plan:  Uncontrolled. Work on lifestyle management, get initial workup.   Orders:  -     Lipid Panel; Future  -     Thyroid Cascade Profile; Future  -     Comprehensive Metabolic Panel; Future  -     CBC; Future  -     Aldosterone & Renin, Direct with Ratio; Future        Review of Systems    Allergies   Allergen Reactions    Penicillins Hives and Rash     Can do keflex         Prior to Visit Medications    Medication Sig Taking? Authorizing Provider   Suvorexant

## 2024-03-23 ENCOUNTER — PATIENT MESSAGE (OUTPATIENT)
Facility: CLINIC | Age: 29
End: 2024-03-23

## 2024-03-23 DIAGNOSIS — M10.072 ACUTE IDIOPATHIC GOUT OF LEFT FOOT: Primary | ICD-10-CM

## 2024-03-25 NOTE — TELEPHONE ENCOUNTER
From: Marv Baker  To: Dr. Rogers Wiggins  Sent: 3/23/2024 9:18 PM EDT  Subject: Uric Acid    Hi Dr. Wiggins,     I forgot to ask at last appointment because I was somewhat stressed about other things. Would you be able to add uric acid to my other lab orders for the follow-up appointment?     Thanks!    Marv

## 2024-04-20 ENCOUNTER — PATIENT MESSAGE (OUTPATIENT)
Facility: CLINIC | Age: 29
End: 2024-04-20

## 2024-04-22 RX ORDER — ALBUTEROL SULFATE 90 UG/1
1 AEROSOL, METERED RESPIRATORY (INHALATION) EVERY 4 HOURS PRN
Qty: 18 G | Refills: 2 | Status: SHIPPED | OUTPATIENT
Start: 2024-04-22

## 2024-04-22 NOTE — TELEPHONE ENCOUNTER
From: Marv Baker  To: Dr. Rogers Wiggins  Sent: 4/20/2024 11:08 PM EDT  Subject: Albuterol HFA     Hi Dr. Wiggins would you be able to Rx my albuterol inhaler? The pollen has been brutal. Been taking Allegra but it's not helping as much as it should and was coughing pretty bad after a run this week.     Thanks,  - Marv

## 2024-06-16 ENCOUNTER — PATIENT MESSAGE (OUTPATIENT)
Facility: CLINIC | Age: 29
End: 2024-06-16

## 2024-06-17 NOTE — TELEPHONE ENCOUNTER
From: Marv Baker  To: Dr. Rogers Wiggins  Sent: 6/16/2024 10:28 PM EDT  Subject: Scopolamine?    Hi Dr. Wiggins,     I have a cruise coming up 6/29. Would you be able to Rx scopolamine patch for this to the [facility] pharmacy? I have never been on this type of ship before and am concerned I may get seasick.     Thank you!  - Marv

## 2024-06-18 RX ORDER — SCOLOPAMINE TRANSDERMAL SYSTEM 1 MG/1
1 PATCH, EXTENDED RELEASE TRANSDERMAL
Qty: 3 PATCH | Refills: 0 | Status: SHIPPED | OUTPATIENT
Start: 2024-06-18

## 2024-07-18 RX ORDER — PANTOPRAZOLE SODIUM 20 MG/1
20 TABLET, DELAYED RELEASE ORAL DAILY
Qty: 90 TABLET | Refills: 1 | Status: SHIPPED | OUTPATIENT
Start: 2024-07-18

## 2024-07-18 NOTE — TELEPHONE ENCOUNTER
Faxed refill request:     Pantoprazole sodium 20mg TEBC 20  Qty: 90    [facility]

## 2024-07-22 ENCOUNTER — TELEPHONE (OUTPATIENT)
Facility: CLINIC | Age: 29
End: 2024-07-22

## 2024-07-22 RX ORDER — PANTOPRAZOLE SODIUM 20 MG/1
20 TABLET, DELAYED RELEASE ORAL DAILY
Qty: 90 TABLET | Refills: 1 | Status: SHIPPED | OUTPATIENT
Start: 2024-07-22

## 2024-07-22 NOTE — TELEPHONE ENCOUNTER
Faxed refill request:     Pantoprazole sodium 20mg tebc 20   Qty:90       [facility]

## 2024-09-11 DIAGNOSIS — Z11.59 NEED FOR HEPATITIS C SCREENING TEST: ICD-10-CM

## 2024-09-11 DIAGNOSIS — Z11.4 SCREENING FOR HIV WITHOUT PRESENCE OF RISK FACTORS: ICD-10-CM

## 2024-09-17 ENCOUNTER — TELEPHONE (OUTPATIENT)
Facility: CLINIC | Age: 29
End: 2024-09-17

## 2024-09-19 DIAGNOSIS — Z11.59 NEED FOR HEPATITIS C SCREENING TEST: ICD-10-CM

## 2024-09-19 DIAGNOSIS — M10.072 ACUTE IDIOPATHIC GOUT OF LEFT FOOT: ICD-10-CM

## 2024-09-19 DIAGNOSIS — I10 PRIMARY HYPERTENSION: ICD-10-CM

## 2024-09-20 LAB
ALBUMIN SERPL-MCNC: 4.1 G/DL (ref 3.5–5)
ALBUMIN/GLOB SERPL: 1.2 (ref 1.1–2.2)
ALP SERPL-CCNC: 86 U/L (ref 45–117)
ALT SERPL-CCNC: 39 U/L (ref 12–78)
ANION GAP SERPL CALC-SCNC: 6 MMOL/L (ref 2–12)
AST SERPL-CCNC: 19 U/L (ref 15–37)
BILIRUB SERPL-MCNC: 0.7 MG/DL (ref 0.2–1)
BUN SERPL-MCNC: 12 MG/DL (ref 6–20)
BUN/CREAT SERPL: 12 (ref 12–20)
CALCIUM SERPL-MCNC: 9.7 MG/DL (ref 8.5–10.1)
CHLORIDE SERPL-SCNC: 102 MMOL/L (ref 97–108)
CHOLEST SERPL-MCNC: 167 MG/DL
CO2 SERPL-SCNC: 28 MMOL/L (ref 21–32)
CREAT SERPL-MCNC: 1.01 MG/DL (ref 0.7–1.3)
ERYTHROCYTE [DISTWIDTH] IN BLOOD BY AUTOMATED COUNT: 13.1 % (ref 11.5–14.5)
GLOBULIN SER CALC-MCNC: 3.3 G/DL (ref 2–4)
GLUCOSE SERPL-MCNC: 89 MG/DL (ref 65–100)
HCT VFR BLD AUTO: 41.8 % (ref 36.6–50.3)
HCV AB SER IA-ACNC: 0.09 INDEX
HCV AB SERPL QL IA: NONREACTIVE
HDLC SERPL-MCNC: 71 MG/DL
HDLC SERPL: 2.4 (ref 0–5)
HGB BLD-MCNC: 14 G/DL (ref 12.1–17)
LDLC SERPL CALC-MCNC: 61.6 MG/DL (ref 0–100)
MCH RBC QN AUTO: 32.8 PG (ref 26–34)
MCHC RBC AUTO-ENTMCNC: 33.5 G/DL (ref 30–36.5)
MCV RBC AUTO: 97.9 FL (ref 80–99)
NRBC # BLD: 0 K/UL (ref 0–0.01)
NRBC BLD-RTO: 0 PER 100 WBC
PLATELET # BLD AUTO: 327 K/UL (ref 150–400)
PMV BLD AUTO: 9.8 FL (ref 8.9–12.9)
POTASSIUM SERPL-SCNC: 3.8 MMOL/L (ref 3.5–5.1)
PROT SERPL-MCNC: 7.4 G/DL (ref 6.4–8.2)
RBC # BLD AUTO: 4.27 M/UL (ref 4.1–5.7)
SODIUM SERPL-SCNC: 136 MMOL/L (ref 136–145)
TRIGL SERPL-MCNC: 172 MG/DL
URATE SERPL-MCNC: 8.1 MG/DL (ref 3.5–7.2)
VLDLC SERPL CALC-MCNC: 34.4 MG/DL
WBC # BLD AUTO: 7.8 K/UL (ref 4.1–11.1)

## 2024-09-21 LAB — TSH SERPL DL<=0.05 MIU/L-ACNC: 1.87 UIU/ML (ref 0.45–4.5)

## 2024-09-23 ENCOUNTER — OFFICE VISIT (OUTPATIENT)
Facility: CLINIC | Age: 29
End: 2024-09-23
Payer: COMMERCIAL

## 2024-09-23 ENCOUNTER — PATIENT MESSAGE (OUTPATIENT)
Facility: CLINIC | Age: 29
End: 2024-09-23

## 2024-09-23 VITALS
SYSTOLIC BLOOD PRESSURE: 160 MMHG | WEIGHT: 166 LBS | DIASTOLIC BLOOD PRESSURE: 82 MMHG | TEMPERATURE: 97.2 F | RESPIRATION RATE: 16 BRPM | HEART RATE: 83 BPM | HEIGHT: 71 IN | BODY MASS INDEX: 23.24 KG/M2 | OXYGEN SATURATION: 98 %

## 2024-09-23 DIAGNOSIS — M10.072 ACUTE IDIOPATHIC GOUT OF LEFT FOOT: Primary | ICD-10-CM

## 2024-09-23 DIAGNOSIS — I10 PRIMARY HYPERTENSION: ICD-10-CM

## 2024-09-23 DIAGNOSIS — M10.072 ACUTE IDIOPATHIC GOUT OF LEFT FOOT: ICD-10-CM

## 2024-09-23 DIAGNOSIS — G47.00 INSOMNIA, UNSPECIFIED TYPE: ICD-10-CM

## 2024-09-23 PROCEDURE — 3079F DIAST BP 80-89 MM HG: CPT | Performed by: FAMILY MEDICINE

## 2024-09-23 PROCEDURE — 3077F SYST BP >= 140 MM HG: CPT | Performed by: FAMILY MEDICINE

## 2024-09-23 PROCEDURE — 99214 OFFICE O/P EST MOD 30 MIN: CPT | Performed by: FAMILY MEDICINE

## 2024-09-23 RX ORDER — SUVOREXANT 10 MG/1
10 TABLET, FILM COATED ORAL NIGHTLY PRN
Qty: 90 TABLET | Refills: 3 | Status: SHIPPED | OUTPATIENT
Start: 2024-09-23 | End: 2025-09-18

## 2024-09-23 RX ORDER — COLCHICINE 0.6 MG/1
TABLET ORAL
Qty: 90 TABLET | Refills: 3 | Status: SHIPPED | OUTPATIENT
Start: 2024-09-23 | End: 2024-09-23 | Stop reason: SDUPTHER

## 2024-09-23 RX ORDER — ALLOPURINOL 100 MG/1
100 TABLET ORAL DAILY
Qty: 90 TABLET | Refills: 3 | Status: SHIPPED | OUTPATIENT
Start: 2024-09-23

## 2024-09-23 RX ORDER — COLCHICINE 0.6 MG/1
TABLET ORAL
Qty: 90 TABLET | Refills: 3 | Status: SHIPPED | OUTPATIENT
Start: 2024-09-23

## 2024-09-23 RX ORDER — COLCHICINE 0.6 MG/1
TABLET ORAL
Qty: 90 TABLET | Refills: 3 | Status: CANCELLED | OUTPATIENT
Start: 2024-09-23

## 2024-09-23 SDOH — ECONOMIC STABILITY: FOOD INSECURITY: WITHIN THE PAST 12 MONTHS, THE FOOD YOU BOUGHT JUST DIDN'T LAST AND YOU DIDN'T HAVE MONEY TO GET MORE.: NEVER TRUE

## 2024-09-23 SDOH — ECONOMIC STABILITY: INCOME INSECURITY: HOW HARD IS IT FOR YOU TO PAY FOR THE VERY BASICS LIKE FOOD, HOUSING, MEDICAL CARE, AND HEATING?: NOT HARD AT ALL

## 2024-09-23 SDOH — ECONOMIC STABILITY: FOOD INSECURITY: WITHIN THE PAST 12 MONTHS, YOU WORRIED THAT YOUR FOOD WOULD RUN OUT BEFORE YOU GOT MONEY TO BUY MORE.: NEVER TRUE

## 2024-09-23 ASSESSMENT — PATIENT HEALTH QUESTIONNAIRE - PHQ9
2. FEELING DOWN, DEPRESSED OR HOPELESS: NOT AT ALL
SUM OF ALL RESPONSES TO PHQ QUESTIONS 1-9: 0
SUM OF ALL RESPONSES TO PHQ QUESTIONS 1-9: 0
1. LITTLE INTEREST OR PLEASURE IN DOING THINGS: NOT AT ALL
SUM OF ALL RESPONSES TO PHQ QUESTIONS 1-9: 0
SUM OF ALL RESPONSES TO PHQ QUESTIONS 1-9: 0
SUM OF ALL RESPONSES TO PHQ9 QUESTIONS 1 & 2: 0

## 2024-09-25 LAB
ALDOST SERPL-MCNC: 5.9 NG/DL (ref 0–30)
ALDOST/RENIN PLAS-RTO: 3.3 (ref 0–30)
RENIN PLAS-CCNC: 1.78 NG/ML/HR (ref 0.17–5.38)

## 2024-10-01 ENCOUNTER — PATIENT MESSAGE (OUTPATIENT)
Facility: CLINIC | Age: 29
End: 2024-10-01

## 2024-10-01 RX ORDER — PREDNISONE 5 MG/1
5 TABLET ORAL DAILY
Qty: 10 TABLET | Refills: 0 | Status: SHIPPED | OUTPATIENT
Start: 2024-10-01 | End: 2024-10-11

## 2024-10-01 RX ORDER — PREDNISONE 10 MG/1
10 TABLET ORAL DAILY
Qty: 10 TABLET | Refills: 0 | Status: SHIPPED | OUTPATIENT
Start: 2024-10-01 | End: 2024-10-11

## 2024-10-01 RX ORDER — PREDNISONE 20 MG/1
20 TABLET ORAL DAILY
Qty: 10 TABLET | Refills: 0 | Status: SHIPPED | OUTPATIENT
Start: 2024-10-01 | End: 2024-10-11

## 2024-11-05 DIAGNOSIS — M10.072 ACUTE IDIOPATHIC GOUT OF LEFT FOOT: ICD-10-CM

## 2024-11-05 LAB — URATE SERPL-MCNC: 6.1 MG/DL (ref 3.5–7.2)

## 2024-11-06 ENCOUNTER — PATIENT MESSAGE (OUTPATIENT)
Facility: CLINIC | Age: 29
End: 2024-11-06

## 2024-11-07 ENCOUNTER — PATIENT MESSAGE (OUTPATIENT)
Facility: CLINIC | Age: 29
End: 2024-11-07

## 2024-11-27 RX ORDER — PANTOPRAZOLE SODIUM 20 MG/1
20 TABLET, DELAYED RELEASE ORAL DAILY
Qty: 90 TABLET | Refills: 1 | Status: SHIPPED | OUTPATIENT
Start: 2024-11-27

## 2024-11-27 NOTE — TELEPHONE ENCOUNTER
[facility] faxed request   Downstairs     PANTOPRAZOLE SODIUM 20 MG TBEC 20    QTY 90    Take one tablet by mouth once a day

## 2025-01-19 ENCOUNTER — PATIENT MESSAGE (OUTPATIENT)
Facility: CLINIC | Age: 30
End: 2025-01-19

## 2025-01-19 DIAGNOSIS — M10.072 ACUTE IDIOPATHIC GOUT OF LEFT FOOT: ICD-10-CM

## 2025-01-20 RX ORDER — ALLOPURINOL 200 MG/1
200 TABLET ORAL DAILY
Qty: 90 TABLET | Refills: 3 | Status: SHIPPED | OUTPATIENT
Start: 2025-01-20

## 2025-03-26 ENCOUNTER — OFFICE VISIT (OUTPATIENT)
Facility: CLINIC | Age: 30
End: 2025-03-26
Payer: COMMERCIAL

## 2025-03-26 VITALS
HEIGHT: 71 IN | DIASTOLIC BLOOD PRESSURE: 77 MMHG | TEMPERATURE: 98.2 F | SYSTOLIC BLOOD PRESSURE: 136 MMHG | HEART RATE: 88 BPM | OXYGEN SATURATION: 99 % | BODY MASS INDEX: 23.24 KG/M2 | WEIGHT: 166 LBS

## 2025-03-26 DIAGNOSIS — K21.9 GASTROESOPHAGEAL REFLUX DISEASE WITHOUT ESOPHAGITIS: ICD-10-CM

## 2025-03-26 DIAGNOSIS — I10 PRIMARY HYPERTENSION: ICD-10-CM

## 2025-03-26 DIAGNOSIS — M10.072 ACUTE IDIOPATHIC GOUT OF LEFT FOOT: Primary | ICD-10-CM

## 2025-03-26 DIAGNOSIS — G47.00 INSOMNIA, UNSPECIFIED TYPE: ICD-10-CM

## 2025-03-26 DIAGNOSIS — E78.1 HYPERTRIGLYCERIDEMIA: ICD-10-CM

## 2025-03-26 PROCEDURE — 3075F SYST BP GE 130 - 139MM HG: CPT | Performed by: FAMILY MEDICINE

## 2025-03-26 PROCEDURE — 99214 OFFICE O/P EST MOD 30 MIN: CPT | Performed by: FAMILY MEDICINE

## 2025-03-26 PROCEDURE — 3078F DIAST BP <80 MM HG: CPT | Performed by: FAMILY MEDICINE

## 2025-03-26 RX ORDER — SUVOREXANT 10 MG/1
10 TABLET, FILM COATED ORAL NIGHTLY PRN
Qty: 90 TABLET | Refills: 3 | Status: SHIPPED | OUTPATIENT
Start: 2025-03-26 | End: 2026-03-21

## 2025-03-26 RX ORDER — ALLOPURINOL 200 MG/1
200 TABLET ORAL DAILY
Qty: 90 TABLET | Refills: 3 | Status: SHIPPED | OUTPATIENT
Start: 2025-03-26

## 2025-03-26 RX ORDER — MAGNESIUM GLYCINATE 100 MG
400 CAPSULE ORAL NIGHTLY
COMMUNITY

## 2025-03-26 SDOH — ECONOMIC STABILITY: FOOD INSECURITY: WITHIN THE PAST 12 MONTHS, THE FOOD YOU BOUGHT JUST DIDN'T LAST AND YOU DIDN'T HAVE MONEY TO GET MORE.: NEVER TRUE

## 2025-03-26 SDOH — ECONOMIC STABILITY: FOOD INSECURITY: WITHIN THE PAST 12 MONTHS, YOU WORRIED THAT YOUR FOOD WOULD RUN OUT BEFORE YOU GOT MONEY TO BUY MORE.: NEVER TRUE

## 2025-03-26 ASSESSMENT — PATIENT HEALTH QUESTIONNAIRE - PHQ9
2. FEELING DOWN, DEPRESSED OR HOPELESS: NOT AT ALL
SUM OF ALL RESPONSES TO PHQ QUESTIONS 1-9: 0
1. LITTLE INTEREST OR PLEASURE IN DOING THINGS: NOT AT ALL
SUM OF ALL RESPONSES TO PHQ QUESTIONS 1-9: 0

## 2025-07-28 DIAGNOSIS — M10.072 ACUTE IDIOPATHIC GOUT OF LEFT FOOT: ICD-10-CM

## 2025-07-28 RX ORDER — ALLOPURINOL 200 MG/1
200 TABLET ORAL DAILY
Qty: 90 TABLET | Refills: 3 | Status: SHIPPED | OUTPATIENT
Start: 2025-07-28